# Patient Record
Sex: MALE | Race: WHITE | Employment: FULL TIME | URBAN - METROPOLITAN AREA
[De-identification: names, ages, dates, MRNs, and addresses within clinical notes are randomized per-mention and may not be internally consistent; named-entity substitution may affect disease eponyms.]

---

## 2017-04-26 ENCOUNTER — TRANSFERRED RECORDS (OUTPATIENT)
Dept: HEALTH INFORMATION MANAGEMENT | Facility: CLINIC | Age: 27
End: 2017-04-26

## 2018-04-26 ENCOUNTER — TRANSFERRED RECORDS (OUTPATIENT)
Dept: HEALTH INFORMATION MANAGEMENT | Facility: CLINIC | Age: 28
End: 2018-04-26

## 2019-03-20 ENCOUNTER — TRANSFERRED RECORDS (OUTPATIENT)
Dept: HEALTH INFORMATION MANAGEMENT | Facility: CLINIC | Age: 29
End: 2019-03-20

## 2020-01-20 ENCOUNTER — ANCILLARY PROCEDURE (OUTPATIENT)
Dept: GENERAL RADIOLOGY | Facility: CLINIC | Age: 30
End: 2020-01-20
Payer: COMMERCIAL

## 2020-01-20 ENCOUNTER — OFFICE VISIT (OUTPATIENT)
Dept: FAMILY MEDICINE | Facility: CLINIC | Age: 30
End: 2020-01-20
Payer: COMMERCIAL

## 2020-01-20 VITALS
DIASTOLIC BLOOD PRESSURE: 78 MMHG | SYSTOLIC BLOOD PRESSURE: 118 MMHG | OXYGEN SATURATION: 99 % | WEIGHT: 181 LBS | HEART RATE: 79 BPM | BODY MASS INDEX: 24.52 KG/M2 | HEIGHT: 72 IN | TEMPERATURE: 100.2 F

## 2020-01-20 DIAGNOSIS — R05.9 COUGH: ICD-10-CM

## 2020-01-20 DIAGNOSIS — J18.9 PNEUMONIA OF LEFT LOWER LOBE DUE TO INFECTIOUS ORGANISM: Primary | ICD-10-CM

## 2020-01-20 PROCEDURE — 71046 X-RAY EXAM CHEST 2 VIEWS: CPT | Mod: FY

## 2020-01-20 PROCEDURE — 99204 OFFICE O/P NEW MOD 45 MIN: CPT | Performed by: NURSE PRACTITIONER

## 2020-01-20 RX ORDER — AZITHROMYCIN 250 MG/1
TABLET, FILM COATED ORAL
Qty: 6 TABLET | Refills: 0 | Status: SHIPPED | OUTPATIENT
Start: 2020-01-20 | End: 2020-10-13

## 2020-01-20 RX ORDER — ALBUTEROL SULFATE 90 UG/1
2 AEROSOL, METERED RESPIRATORY (INHALATION) EVERY 6 HOURS
Qty: 1 INHALER | Refills: 0 | Status: SHIPPED | OUTPATIENT
Start: 2020-01-20 | End: 2020-10-29

## 2020-01-20 ASSESSMENT — MIFFLIN-ST. JEOR: SCORE: 1819.01

## 2020-01-20 NOTE — LETTER
January 20, 2020      Harry Baird  [address] [facility] [id number]        To Whom It May Concern:    Harry Baird was seen in our clinic. He may return to work when fever gone for 24 hours. Please excuse absences due to illness.      Sincerely,        Linda Maldonado, CNP

## 2020-01-21 NOTE — PATIENT INSTRUCTIONS
--Saline nasal spray or a franci pot can be helpful in clearing out the sinuses and making them feel better. Also, sleep propped up on an extra pillow to help with drainage.  --Using a humidifier at night will also add moisture to the air and help with symptoms.  --Guaifenesen(Mucinex 12 hour) can be used to help loosen and thin mucus. Take as directed.  --Avoid use of other over the counter medications, ideally we want mucus to drain to prevent further infection from developing.  --Ibuprofen or Tylenol as directed is ok for headache or sinus pressure.

## 2020-01-21 NOTE — PROGRESS NOTES
Subjective   Harry Baird is a 29 year old male who presents to clinic today for the following health issues:    HPI   Acute Illness   Acute illness concerns: cough   Onset: x 3 days    Fever: YES, 100.2    Chills/Sweats: no    Headache (location?): no     Sinus Pressure:no    Conjunctivitis:  no    Ear Pain: no    Rhinorrhea: no     Congestion: no      Sore Throat: YES- on and off due to cough      Cough: YES-productive of clear sputum    Wheeze: Yes     Decreased Appetite: Yes, mild     Nausea: no    Vomiting: no    Diarrhea:  no    Dysuria/Freq.: no    Fatigue/Achiness: YES- fatigue.     Sick/Strep Exposure: YES- family dx with pneumonia wife and child     Therapies Tried and outcome: cough syrup worked ok at night, robitussin.     Worsening cough with fever past 3 days. Wife and kid with pneumonia. Flu on wife was negative today. SOB, fatigued much more than usual.    Reviewed and updated as needed this visit by provider:  Tobacco  Allergies  Meds  Problems  Med Hx  Surg Hx  Fam Hx         Review of Systems   Constitutional, HEENT, cardiovascular, pulmonary, GI, , musculoskeletal, neuro, skin, endocrine and psych systems are negative, except as otherwise noted per HPI.      Objective   /78   Pulse 79   Temp 100.2  F (37.9  C) (Tympanic)   Ht 1.829 m (6')   Wt 82.1 kg (181 lb)   SpO2 99%   BMI 24.55 kg/m   Body mass index is 24.55 kg/m .  Physical Exam   GENERAL: healthy, alert, well nourished, well hydrated, no distress  Head: Normocephalic, atraumatic.  Eyes: Conjunctiva clear, non icteric. PERRLA.  Ears: External ears normal, Bilateral tympanic membranes normal.   Nose: Septum midline, nasal mucosa erythematous, inflamed.  Mouth / Throat: Normal dentition.  No oral lesions. Posterior pharynx erythematous, no exudates, tonsils + 1 bilaterally.  Neck: Supple, moderate anterior cervical lymphadenopathy present, posterior cervical lymphadenopathy not present, trachea midline.  RESP: Left lower  lobe, crackles, rhonchi.  CV: regular rates and rhythm, normal S1 S2, no S3 or S4 and no murmur, no click or rub -  MS: extremities- no gross deformities noted, no edema  SKIN: no suspicious lesions, no rashes    Diagnostic Test Results  CXR - has some patchiness in LLL on my read-correlate to area or abnormality on exam.      Assessment & Plan   Harry was seen today for cough.    Diagnoses and all orders for this visit:    Pneumonia of left lower lobe due to infectious organism (H)  -     azithromycin (ZITHROMAX) 250 MG tablet; Two tablets first day, then one tablet daily for four days  -     albuterol (PROAIR HFA/PROVENTIL HFA/VENTOLIN HFA) 108 (90 Base) MCG/ACT inhaler; Inhale 2 puffs into the lungs every 6 hours    Cough  -     XR Chest 2 Views; Future      Treat as pneumonia. Follow up if not improving with antibiotics.   If persistent sob or wheezing please return to clinic.       See Patient Instructions    Return in about 2 weeks (around 2/3/2020) for If acute symptoms are persistenting or worsening..            AC Dueñas     [address] [address]  [email or web address]   Office: [phone]

## 2020-03-01 ENCOUNTER — HEALTH MAINTENANCE LETTER (OUTPATIENT)
Age: 30
End: 2020-03-01

## 2020-10-26 NOTE — PATIENT INSTRUCTIONS
Preventive Health Recommendations  Male Ages 26 - 39    Yearly exam:             See your health care provider every year in order to  o   Review health changes.   o   Discuss preventive care.    o   Review your medicines if your doctor has prescribed any.    You should be tested each year for STDs (sexually transmitted diseases), if you re at risk.     After age 35, talk to your provider about cholesterol testing. If you are at risk for heart disease, have your cholesterol tested at least every 5 years.     If you are at risk for diabetes, you should have a diabetes test (fasting glucose).  Shots: Get a flu shot each year. Get a tetanus shot every 10 years.     Nutrition:    Eat at least 5 servings of fruits and vegetables daily.     Eat whole-grain bread, whole-wheat pasta and brown rice instead of white grains and rice.     Get adequate Calcium and Vitamin D.     Lifestyle    Exercise for at least 150 minutes a week (30 minutes a day, 5 days a week). This will help you control your weight and prevent disease.     Limit alcohol to one drink per day.     No smoking.     Wear sunscreen to prevent skin cancer.     See your dentist every six months for an exam and cleaning.     Patient Education     Raynaud Disease  Your healthcare provider has told you that you have Raynaud disease. It is also called Raynaud phenomenon or Raynaud syndrome. There is no cure for Raynaud disease, but you can manage it to help prevent attacks.    What are the symptoms of Raynaud disease?  A Raynaud disease attack is often triggered by cold or stress. During an attack, blood vessels suddenly narrow (called vasospasm).  This most often happens in fingers and toes. In rare cases, the nose, ears, nipples, or even tongue are affected. Narrowed blood vessels reduce the blood supply to the area. The area then turns white, blue, or red. The area may feel numb or painful. As the attack passes, the blood vessels open. The affected area may turn  bright red as it warms up, then returns to normal color.  What is the cause of Raynaud disease?  With Raynaud disease, it is believed that blood vessels in the affected areas overrespond to certain triggers, such as cold. This makes them narrow (called vasospasm) much more than in people without the disease. Experts don t know what causes the blood vessels to react so strongly to certain triggers. In between attacks, the blood vessels are normal and healthy. Attacks don t permanently damage the blood vessels. But may thicken the artery walls.   In some cases, Raynaud disease happens along with another disease or condition. This is often a connective tissue disorder, such as lupus, scleroderma, or rheumatoid arthritis. This is called secondary Raynaud disease (as opposed to primary Raynaud disease discussed above) and may be more severe. If this is the case for you, you and your healthcare provider can discuss treatment for the underlying condition.  What are the risk factors?  Risk factors for Raynaud disease include:    Women are more likely to get Raynaud disease than men.    Younger people are at higher risk, usually ages 15 to 30.    Living in colder climates increases risk.    Having a family member with Raynaud disease increases your risk.    Underlying rheumatoid conditions may increase your risk.   What are possible triggers?  Triggers for Raynaud disease include:     Cold    Stress    Caffeine    Smoking    Repetitive movements    Certain medicines, such as beta-blockers, migraine medicine, birth control pills and others    Injury  How is Raynaud disease diagnosed?  Your description of your symptoms, a health history, and a physical exam are often enough for a diagnosis. Blood tests and other tests may be done to see if any underlying conditions are present and rule out other problems.  How is Raynaud disease treated?  There is no cure for Raynaud disease. But you can control symptoms and reduce the number  and severity of attacks. For most people, avoiding triggers is enough to limit attacks. Your healthcare provider may suggest the following:    Take precautions to help prevent your hands and feet from losing circulation. This includes:  ? Dressing warmly in cold weather.  ? Wearing gloves or mittens when your hands may become cold, such as when you use the refrigerator or freezer.  ? Avoiding stress and caffeine.  ? Exercising regularly. This may reduce the number and severity of attacks.   ? If you smoke, quitting may improve the condition. This is because smoking causes your blood vessels to narrow and reduces blood flow.    Soak your hands or feet in warm (not hot) water. Do this at the first sign of an attack. Keep soaking until your skin color returns to normal.  In some people, symptoms are persistent or troubling. For these cases, other treatments are a choice. Your healthcare provider can tell you more about the following:    Prescription medicines. Some medicines that relax and widen blood vessels, such as calcium channel blockers. These may help relieve symptoms.    Nerve surgery. This is used for severe cases that don t respond to other treatments. Surgery removes the nerves that surround the blood vessels in the hands and feet. Without nerve stimulation, the blood vessels stay more relaxed. They are less likely to become very narrow due to stimuli. Nerves may be blocked using injections in some cases.  Most cases of Raynaud disease are not cause for concern. The disease doesn t get worse and isn t likely to cause any permanent damage. If attacks are severe, last for a long time, or occur very often, skin damage may result. Controlling attacks can help prevent this.  When to seek medical care  The following problems happen rarely, but they can be serious. Call your healthcare provider right away if you notice any of the following:    Infection or sores on the skin    A finger or toe turns black    The  skin breaks open on its own    A rash develops    A finger or toe joint becomes painful or swollen  Pedro Luis last reviewed this educational content on 6/1/2018 2000-2020 [employer]. [address] [place] [address], [place] [address]. All rights reserved. This information is not intended as a substitute for professional medical care. Always follow your healthcare professional's instructions.

## 2020-10-26 NOTE — PROGRESS NOTES
SUBJECTIVE:   CC: Harry Baird is an 30 year old male who presents for preventive health visit.     HPI      Healthy Habits:    Do you get at least three servings of calcium containing foods daily (dairy, green leafy vegetables, etc.)? yes    Amount of exercise or daily activities, outside of work: 1-2 hours a week    Problems taking medications regularly No    Medication side effects: No    Have you had an eye exam in the past two years? no    Do you see a dentist twice per year? yes    Do you have sleep apnea, excessive snoring or daytime drowsiness?no    Feet get cold off and on, no pain        Today's PHQ-2 Score:   PHQ-2 ( 1999 [employer]) 10/29/2020 1/20/2020   Q1: Little interest or pleasure in doing things 0 0   Q2: Feeling down, depressed or hopeless 0 0   PHQ-2 Score 0 0       Abuse: Current or Past(Physical, Sexual or Emotional)- No  Do you feel safe in your environment? Yes        Social History     Tobacco Use     Smoking status: Never Smoker     Smokeless tobacco: Never Used   Substance Use Topics     Alcohol use: Yes     Alcohol/week: 0.0 - 1.7 standard drinks     Comment: Quite infrequent     If you drink alcohol do you typically have >3 drinks per day or >7 drinks per week? No                      Last PSA: No results found for: PSA    Reviewed orders with patient. Reviewed health maintenance and updated orders accordingly - Yes  Reviewed and updated as needed this visit by clinical staff  Tobacco  Allergies  Meds   Med Hx  Surg Hx  Fam Hx  Soc Hx        Reviewed and updated as needed this visit by Provider                  ROS:  Constitutional, HEENT, cardiovascular, pulmonary, GI, , musculoskeletal, neuro, skin, endocrine and psych systems are negative, except as otherwise noted.  OBJECTIVE:   /80 (BP Location: Left arm)   Pulse 63   Temp 97.3  F (36.3  C) (Tympanic)   Resp 12   Ht 1.829 m (6')   Wt 83.5 kg (184 lb)   SpO2 99%   BMI 24.95 kg/m    EXAM:  GENERAL: healthy, alert and  no distress  EYES: Eyes grossly normal to inspection, PERRL and conjunctivae and sclerae normal  HENT: ear canals and TM's normal, nose and mouth without ulcers or lesions  NECK: no adenopathy, no asymmetry, masses, or scars and thyroid normal to palpation  RESP: lungs clear to auscultation - no rales, rhonchi or wheezes  BREAST: normal without masses, tenderness or nipple discharge and no palpable axillary masses or adenopathy  CV: regular rate and rhythm, normal S1 S2, no S3 or S4, no murmur, click or rub, no peripheral edema and peripheral pulses strong  ABDOMEN: soft, nontender, no hepatosplenomegaly, no masses and bowel sounds normal   (male): normal male genitalia without lesions or urethral discharge, no hernia  MS: no gross musculoskeletal defects noted, no edema  SKIN: no suspicious lesions or rashes  NEURO: Normal strength and tone, mentation intact and speech normal  PSYCH: mentation appears normal, affect normal/bright  LYMPH: no cervical, supraclavicular, axillary, or inguinal adenopathy    ASSESSMENT/PLAN:   Routine general medical examination at a health care facility  Overall healthy    Screening for diabetes mellitus  - Basic metabolic panel    Screening, deficiency anemia, iron  - CBC with platelets    Screening for lipid disorders  - Lipid panel reflex to direct LDL Fasting      COUNSELING:  Reviewed preventive health counseling, as reflected in patient instructions    Estimated body mass index is 24.95 kg/m  as calculated from the following:    Height as of this encounter: 1.829 m (6').    Weight as of this encounter: 83.5 kg (184 lb).       reports that he has never smoked. He has never used smokeless tobacco.      Return in about 1 year (around 10/29/2021) for Wellness Exam and fasting labs, in person, with Dr Bruce Farnsworth.           Bruce Farnsworth MD     [address] [address]  [email or web address]   Office: [phone]   Pager: [phone]

## 2020-10-29 ENCOUNTER — OFFICE VISIT (OUTPATIENT)
Dept: FAMILY MEDICINE | Facility: CLINIC | Age: 30
End: 2020-10-29
Payer: COMMERCIAL

## 2020-10-29 VITALS
RESPIRATION RATE: 12 BRPM | SYSTOLIC BLOOD PRESSURE: 118 MMHG | HEIGHT: 72 IN | OXYGEN SATURATION: 99 % | WEIGHT: 184 LBS | TEMPERATURE: 97.3 F | BODY MASS INDEX: 24.92 KG/M2 | DIASTOLIC BLOOD PRESSURE: 80 MMHG | HEART RATE: 63 BPM

## 2020-10-29 DIAGNOSIS — Z13.220 SCREENING FOR LIPID DISORDERS: ICD-10-CM

## 2020-10-29 DIAGNOSIS — Z13.0 SCREENING, DEFICIENCY ANEMIA, IRON: ICD-10-CM

## 2020-10-29 DIAGNOSIS — Z00.00 ROUTINE GENERAL MEDICAL EXAMINATION AT A HEALTH CARE FACILITY: Primary | ICD-10-CM

## 2020-10-29 DIAGNOSIS — Z13.1 SCREENING FOR DIABETES MELLITUS: ICD-10-CM

## 2020-10-29 PROCEDURE — 99395 PREV VISIT EST AGE 18-39: CPT | Performed by: FAMILY MEDICINE

## 2020-10-29 ASSESSMENT — MIFFLIN-ST. JEOR: SCORE: 1827.62

## 2020-11-11 DIAGNOSIS — Z13.220 SCREENING FOR LIPID DISORDERS: ICD-10-CM

## 2020-11-11 DIAGNOSIS — Z13.1 SCREENING FOR DIABETES MELLITUS: ICD-10-CM

## 2020-11-11 DIAGNOSIS — Z13.0 SCREENING, DEFICIENCY ANEMIA, IRON: ICD-10-CM

## 2020-11-11 LAB
ERYTHROCYTE [DISTWIDTH] IN BLOOD BY AUTOMATED COUNT: 12.2 % (ref 10–15)
HCT VFR BLD AUTO: 40.4 % (ref 40–53)
HGB BLD-MCNC: 13.9 G/DL (ref 13.3–17.7)
MCH RBC QN AUTO: 29.4 PG (ref 26.5–33)
MCHC RBC AUTO-ENTMCNC: 34.4 G/DL (ref 31.5–36.5)
MCV RBC AUTO: 86 FL (ref 78–100)
PLATELET # BLD AUTO: 229 10E9/L (ref 150–450)
RBC # BLD AUTO: 4.72 10E12/L (ref 4.4–5.9)
WBC # BLD AUTO: 8.9 10E9/L (ref 4–11)

## 2020-11-11 PROCEDURE — 80061 LIPID PANEL: CPT | Performed by: FAMILY MEDICINE

## 2020-11-11 PROCEDURE — 80048 BASIC METABOLIC PNL TOTAL CA: CPT | Performed by: FAMILY MEDICINE

## 2020-11-11 PROCEDURE — 85027 COMPLETE CBC AUTOMATED: CPT | Performed by: FAMILY MEDICINE

## 2020-11-12 LAB
ANION GAP SERPL CALCULATED.3IONS-SCNC: 4 MMOL/L (ref 3–14)
BUN SERPL-MCNC: 20 MG/DL (ref 7–30)
CALCIUM SERPL-MCNC: 9.2 MG/DL (ref 8.5–10.1)
CHLORIDE SERPL-SCNC: 105 MMOL/L (ref 94–109)
CHOLEST SERPL-MCNC: 164 MG/DL
CO2 SERPL-SCNC: 28 MMOL/L (ref 20–32)
CREAT SERPL-MCNC: 0.81 MG/DL (ref 0.66–1.25)
GFR SERPL CREATININE-BSD FRML MDRD: >90 ML/MIN/{1.73_M2}
GLUCOSE SERPL-MCNC: 71 MG/DL (ref 70–99)
HDLC SERPL-MCNC: 53 MG/DL
LDLC SERPL CALC-MCNC: 105 MG/DL
NONHDLC SERPL-MCNC: 111 MG/DL
POTASSIUM SERPL-SCNC: 4 MMOL/L (ref 3.4–5.3)
SODIUM SERPL-SCNC: 137 MMOL/L (ref 133–144)
TRIGL SERPL-MCNC: 31 MG/DL

## 2020-11-13 NOTE — RESULT ENCOUNTER NOTE
Dear Harry,    Here is a summary of your recent test results:  -Normal red blood cell (hgb) levels, normal white blood cell count and normal platelet levels.  -Cholesterol levels (LDL,HDL, Triglycerides) are normal.  ADVISE: rechecking in 1 year.   -Kidney function is normal (Cr, GFR), Sodium is normal, Potassium is normal, Calcium is normal, Glucose is normal.     For additional lab test information, [email or web address] is an excellent reference.           Thank you very much for trusting me and [facility].     Have a peaceful day.    Healthy regards,  Bruce Farnsworth MD

## 2021-10-02 ENCOUNTER — HEALTH MAINTENANCE LETTER (OUTPATIENT)
Age: 31
End: 2021-10-02

## 2022-01-22 ENCOUNTER — HEALTH MAINTENANCE LETTER (OUTPATIENT)
Age: 32
End: 2022-01-22

## 2023-01-14 ENCOUNTER — HEALTH MAINTENANCE LETTER (OUTPATIENT)
Age: 33
End: 2023-01-14

## 2023-04-23 ENCOUNTER — HEALTH MAINTENANCE LETTER (OUTPATIENT)
Age: 33
End: 2023-04-23